# Patient Record
(demographics unavailable — no encounter records)

---

## 2024-10-29 NOTE — PHYSICAL EXAM
[Well Developed] : well developed [Well Nourished] : well nourished [No Acute Distress] : no acute distress [Normal Conjunctiva] : normal conjunctiva [Normal Venous Pressure] : normal venous pressure [No Carotid Bruit] : no carotid bruit [Normal S1, S2] : normal S1, S2 [No Murmur] : no murmur [No Rub] : no rub [No Gallop] : no gallop [Clear Lung Fields] : clear lung fields [Good Air Entry] : good air entry [No Respiratory Distress] : no respiratory distress  [Soft] : abdomen soft [Normal Gait] : normal gait [No Cyanosis] : no cyanosis [No Clubbing] : no clubbing [No Varicosities] : no varicosities [No Rash] : no rash [No Skin Lesions] : no skin lesions [Moves all extremities] : moves all extremities [No Focal Deficits] : no focal deficits [Normal Speech] : normal speech [Alert and Oriented] : alert and oriented [Normal memory] : normal memory [de-identified] : Unequal carotid upstrokes [de-identified] : Truncal obesity [de-identified] : Trace edema.

## 2024-10-29 NOTE — REVIEW OF SYSTEMS
Disc results with patient. Questions answered [Dyspnea on exertion] : dyspnea during exertion [Negative] : Heme/Lymph [FreeTextEntry6] : Sleep apnea

## 2024-10-29 NOTE — REASON FOR VISIT
[FreeTextEntry1] : This is a 66-year-old male with history of CAD, status post PCI (see details below), obesity, hypertension, mixed hyperlipidemia, fatty liver, AAA, sleep apnea and prediabetes.  Patient activity is limited by low back pain.  He is seeing pain management.  He is scheduled for epidural injection treatment. Activity level is mostly unchanged.  Limited by backache and weight. Denies exertional angina.  No PND, orthopnea or pedal edema.  No palpitations.   Status post AAA repair from which he recovered uneventfully.  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    Hypertension: Blood pressure is borderline high.  In the past, valsartan dose was reduced.  He continues to take Toprol and HCTZ.    On 3/29/2022, he had chest discomfort described as indigestion and pressure at 10:30 in the morning.  He went to the ER at [facility].  He was found to have STEMI as well as severely hypertensive - given IV labetalol.  The patient was taken to the Cath Lab and had occluded LAD which was opened up and had PCI x2 in the LAD and staged subsequent LCx PCI.  His LDL was in range from 156-170 prior to starting statins on discharge.  Since statins were started, his LDL has come down to 79 in September 2022 with normal LFT and HDL 57.  Labs in April 2024: A1c 6, LDL 75, normal creatinine and potassium.  Hemoglobin 15.8.  Echocardiograph  on 3/31/2022 showed EF of 55 to 60% with impaired relaxation.  No pericardial effusion.  Repeat echocardiogram in May 2024 did not show significant change.  There was mild LVH.  Normal PASP  EKG April 2022 shows sinus rhythm, anterior fascicular block.  Lexiscan stress test with SPECT November 2023: Probably normal perfusion.  No evidence of ischemia.  Exercise stress test in May 2024 -the test was designed to be limited to walking 5 METS.  He had no angina.  No ischemic EKG changes.

## 2024-10-29 NOTE — DISCUSSION/SUMMARY
[FreeTextEntry1] : 66 year-old male with several atherosclerotic risk factors and comorbid conditions as noted in HPI.  Status post anterior wall STEMI on 3/29/2022.  Status post PCI and NICKIE in LAD x2, staged LCx PCI with NICKIE.     3-day monitor showed PVC burden of 2.3%.  No other significant arrhythmias.  Asymptomatic, on beta-blockers  Echocardiogram in November 2023 and May 2024: Normal EF.  Slightly dilated aortic root.  No significant change.  Slight LVH  StressShould be continued without interruption; or the other option is to stop Plavix 5 days before the procedure and start aspirin 81 mg daily at the same time and continue a test with SPECT November 2023: See above.  Subsequent low-level ETT in May 2024 noted above.  Hyperlipidemia: Continue statin therapy.  LDL decreased  on statins.  Target LDL less than 70.  Reported above.  Currently on aspirin and on Plavix daily.  Carotid ultrasound was done in April 2022: Nonobstructive moderate disease.  Continue current meds  **PREOP: On 10/29/2024 Patient scheduled for epidural injection treatment for backache.  On low-level ETT, she was able to exercise 5 METS and reached 75% of max heart rate without angina or ischemic EKG changes.  Echocardiogram showed normal EF, normal PASP and no change compared to previous.  Plavix can be held for 7 days prior to procedure to reduce bleeding complication.  Aspirin should be continued uninterrupted.  Postoperative, Plavix should be resumed as per the pain specialist.  Other cardiac medications should be continued uninterrupted.  Thank you for this referral and allowing me to participate in the care of this patient.  If I can be of any further help or  if you have any questions, please do not hesitate to contact me  Sincerely,  John Anders MD, FACC, [place]

## 2025-04-17 NOTE — DISCUSSION/SUMMARY
[FreeTextEntry1] : RENY VALADEZ is a 67 year old M who presents today Apr 17, 2025 with the above history and the following active issues:   STEMI - CAD s/p PCI with NICKIE x2 at LAD, NICKIE at LCx 2022. No exertional sx. Compliant with meds. He did not deescalate from DAPT. He remains on ASA and Clopidogrel. At this time, he may discontinue Clopidogrel. He should remain on ASA indefinitely with no interruption.   HTN: Well controlled. Continue current medications.   Dilated aorta: Aortic Root 4.4cm in 5/2024. Recommend Echo to reevaluate. F/u to review results.   HLD: No recent lab work available for my review, LDL was 73 in 2024.   AAA s/p repair: Ongoing f/u with Dr. Carrillo.   ANAIS: Noncompliant with CPAP. Importance of PAP reinforced to him. Recommended f/u with pulm for alternative options.   pre-DM/DM? A1C was 6.3% in 2024.  Obesity with BMI 37.13.  Chronic back pain with limited mobility. GLP1a would be the safest option for weight loss for this patient and this in the ONLY class of medications that has been proven to lower the risk of major cardiovascular events such as stroke, heart attack, or death in adults with known heart disease and cardiovascular risk factors.  Patient educated and advised to follow active lifestyle with regular cardiovascular exercise, as well as diet modification with low sodium, low fat, and avoidance of excessive alcohol.   Updated lab work has been requested including A1C and lipid profile. Lab slip provided to him. He will have this done prior to next visit.   In regard to his upcoming injection with pain management: Patient should remain on ASA uninterrupted, discontinue Plavix as above. At present, there are no unstable coronary syndromes, decompensated heart failure, severe valvular heart disease or significant dysrhythmias. Baseline functional status is acceptable. Risk not attenuated with further CV testing. Prior testing as outlined above. The clinical benefit of the proposed procedure outweighs the associated cardiovascular risk.   F/u after testing or sooner PRN.  Red flag symptoms which would warrant sooner emergent evaluation reviewed with the patient. All questions and concerns were addressed and answered.   Sincerely,  Susan Alexander, [facility] Patient's history, testing, medications and any relative changes in plan of care reviewed with supervising MD: Dr. Joel Ruvalcaba.

## 2025-04-17 NOTE — DISCUSSION/SUMMARY
[FreeTextEntry1] : RENY VALADEZ is a 67 year old M who presents today Apr 17, 2025 with the above history and the following active issues:   STEMI - CAD s/p PCI with NICKIE x2 at LAD, NICKIE at LCx 2022. No exertional sx. Compliant with meds. He did not deescalate from DAPT. He remains on ASA and Clopidogrel. At this time, he may discontinue Clopidogrel. He should remain on ASA indefinitely with no interruption.   HTN: Well controlled. Continue current medications.   Dilated aorta: Aortic Root 4.4cm in 5/2024. Recommend Echo to reevaluate. F/u to review results.   HLD: No recent lab work available for my review, LDL was 73 in 2024.   AAA s/p repair: Ongoing f/u with Dr. Carrillo.   ANAIS: Noncompliant with CPAP. Importance of PAP reinforced to him. Recommended f/u with pulm for alternative options.   pre-DM/DM? A1C was 6.3% in 2024.  Obesity with BMI 37.13.  Chronic back pain with limited mobility. GLP1a would be the safest option for weight loss for this patient and this in the ONLY class of medications that has been proven to lower the risk of major cardiovascular events such as stroke, heart attack, or death in adults with known heart disease and cardiovascular risk factors.  Patient educated and advised to follow active lifestyle with regular cardiovascular exercise, as well as diet modification with low sodium, low fat, and avoidance of excessive alcohol.   Updated lab work has been requested including A1C and lipid profile. Lab slip provided to him. He will have this done prior to next visit.   In regard to his upcoming injection with pain management: Patient should remain on ASA uninterrupted, discontinue Plavix as above. At present, there are no unstable coronary syndromes, decompensated heart failure, severe valvular heart disease or significant dysrhythmias. Baseline functional status is acceptable. Risk not attenuated with further CV testing. Prior testing as outlined above. The clinical benefit of the proposed procedure outweighs the associated cardiovascular risk.   F/u after testing or sooner PRN.  Red flag symptoms which would warrant sooner emergent evaluation reviewed with the patient. All questions and concerns were addressed and answered.   Sincerely,  Susan Alexander, [facility] Patient's history, testing, medications and any relative changes in plan of care reviewed with supervising MD: Dr. Joel Ruvalcaba.

## 2025-04-17 NOTE — CARDIOLOGY SUMMARY
[de-identified] : 5/2024 Exercised 2mins 20sec on Jose Alberto protocol, 5 METS. Negative for ischemia with low specificity.  12/2023 [facility]: Probably normal, negative EKGs.  [de-identified] : 5/2024 LVEF 60-65%, normal diastolic function, mild LVH, Aortic Root 4.4cm. PASP 16mmHg.  [de-identified] : 4/2024 CTA Abd/Pelvis: Uncomplicated infrarenal aortic aneurysm (5.8 cm, max diameter), enlarged from 4/2/2024 (5 cm) and bilateral iliac artery aneurysms (2.2 cm max diameter, from 2 cm).  [de-identified] : 5/2024 Abd US Duplex: 5.7 x 5.7cm infrarenal abdominal aortic aneurysm with mural thrombus.

## 2025-04-17 NOTE — PHYSICAL EXAM
[No Acute Distress] : no acute distress [Obese] : obese [No Carotid Bruit] : no carotid bruit [Normal S1, S2] : normal S1, S2 [No Murmur] : no murmur [Clear Lung Fields] : clear lung fields [No Respiratory Distress] : no respiratory distress  [Abnormal Gait] : abnormal gait [No Edema] : no edema [Normal Radial B/L] : normal radial B/L [Moves all extremities] : moves all extremities [No Focal Deficits] : no focal deficits [Normal Speech] : normal speech [Alert and Oriented] : alert and oriented [de-identified] : secondary to back pain and L foot drop (nerve disorder from L4/L5 per pt).

## 2025-04-17 NOTE — REVIEW OF SYSTEMS
[Easy Bruising] : a tendency for easy bruising [Negative] : Neurological [FreeTextEntry9] : back pain, see HPI.

## 2025-04-17 NOTE — HISTORY OF PRESENT ILLNESS
[FreeTextEntry1] : Riaz is a 67yoM with PMHx: STEMI - CAD s/p PCI (NICKIE at LAD x2, and LCx 2022), HTN, HLD, dilated aortic root, AAA s/p EVAR repair Dr. Melgoza 2024 (c/b accessory renal artery), ANAIS, pre-DM, fatty liver and obesity. Also, back pain, follows with pain mgmt.   He was last seen 10/2024 feeling well with no active CV sx, noncompliant with CPAP.   He presents in clinic today for routine interval follow up. He reports feeling well from a CV perspective. He is not very active secondary to back pain, he walks and stair climbs in his home with no exertional sx.  There is no exertional chest pain, pressure or discomfort. There is no significant dyspnea on exertion or shortness of breath. There is no LE edema, PND or orthopnea. There are no symptomatic palpitations, lightheadedness, dizziness or syncope.   BP is well controlled at 124/70.  He has upcoming spinal injection with pain management, 2 weeks from now.  He has ongoing f/u with Dr. Carrillo at 6-month intervals regarding AAA s/p repair, last visit was <2 months ago and reports doing well with no changes.

## 2025-04-17 NOTE — CARDIOLOGY SUMMARY
[de-identified] : 5/2024 Exercised 2mins 20sec on Jose Alberto protocol, 5 METS. Negative for ischemia with low specificity.  12/2023 [facility]: Probably normal, negative EKGs.  [de-identified] : 5/2024 LVEF 60-65%, normal diastolic function, mild LVH, Aortic Root 4.4cm. PASP 16mmHg.  [de-identified] : 4/2024 CTA Abd/Pelvis: Uncomplicated infrarenal aortic aneurysm (5.8 cm, max diameter), enlarged from 4/2/2024 (5 cm) and bilateral iliac artery aneurysms (2.2 cm max diameter, from 2 cm).  [de-identified] : 5/2024 Abd US Duplex: 5.7 x 5.7cm infrarenal abdominal aortic aneurysm with mural thrombus.

## 2025-04-17 NOTE — ADDENDUM
[FreeTextEntry1] :  I personally seen the patient and performed the evaluation and management services. I discussed the care with the ACP , reviewed the note, and agree with plan of care as outlined above, except where documented below, which is my personal assessment/plan.  STEMI - CAD s/p PCI with NICKIE x2 at LAD, NICKIE at LCx 2022. No exertional sx. Compliant with meds. He did not deescalate from DAPT. He remains on ASA and Clopidogrel. At this time, he may discontinue Clopidogrel. He should remain on ASA indefinitely with no interruption.   HTN: Well controlled. Continue current medications.   Dilated aorta: Aortic Root 4.4cm in 5/2024. Recommend Echo to reevaluate. F/u to review results.   HLD: No recent lab work available for my review, LDL was 73 in 2024.   AAA s/p repair: Ongoing f/u with Dr. Carrillo.   ANAIS: Noncompliant with CPAP. Importance of PAP reinforced to him. Recommended f/u with pulm for alternative options.

## 2025-04-17 NOTE — PHYSICAL EXAM
[No Acute Distress] : no acute distress [Obese] : obese [No Carotid Bruit] : no carotid bruit [Normal S1, S2] : normal S1, S2 [No Murmur] : no murmur [Clear Lung Fields] : clear lung fields [No Respiratory Distress] : no respiratory distress  [Abnormal Gait] : abnormal gait [No Edema] : no edema [Normal Radial B/L] : normal radial B/L [Moves all extremities] : moves all extremities [No Focal Deficits] : no focal deficits [Normal Speech] : normal speech [Alert and Oriented] : alert and oriented [de-identified] : secondary to back pain and L foot drop (nerve disorder from L4/L5 per pt).

## 2025-05-19 NOTE — HISTORY OF PRESENT ILLNESS
[FreeTextEntry1] : Riaz is a 67yoM with PMHx: STEMI - CAD s/p PCI (NICKIE at LAD x2, and LCx 2022), HTN, HLD, dilated aortic root, AAA s/p EVAR repair Dr. Melgoza 2024 (c/b accessory renal artery), ANAIS, pre-DM, fatty liver and obesity. Also, back pain, follows with pain mgmt.   He is not very active secondary to back pain. There is no exertional chest pain, pressure or discomfort. There is no significant dyspnea on exertion or shortness of breath. There is no LE edema, PND or orthopnea. There are no symptomatic palpitations, lightheadedness, dizziness or syncope.   BP is borderline controlled at 130 / 84 mmHg  He has ongoing f/u with Dr. Carrillo at 6-month intervals regarding AAA s/p repair.  No acute changes at last OV with him

## 2025-05-19 NOTE — PHYSICAL EXAM
[No Acute Distress] : no acute distress [Obese] : obese [No Carotid Bruit] : no carotid bruit [Normal S1, S2] : normal S1, S2 [No Murmur] : no murmur [Clear Lung Fields] : clear lung fields [No Respiratory Distress] : no respiratory distress  [Abnormal Gait] : abnormal gait [No Edema] : no edema [Normal Radial B/L] : normal radial B/L [Moves all extremities] : moves all extremities [No Focal Deficits] : no focal deficits [Normal Speech] : normal speech [Alert and Oriented] : alert and oriented [de-identified] : secondary to back pain and L foot drop (nerve disorder from L4/L5 per pt).

## 2025-05-19 NOTE — DISCUSSION/SUMMARY
[FreeTextEntry1] : RENY VALADEZ is a 67 year old M who presents today Apr 17, 2025 with the above history and the following active issues:   STEMI - CAD s/p PCI with NICKIE x2 at LAD, NICKIE at LCx 2022. No exertional sx. Compliant with meds. On ASA indefinitely with no interruption.   Echocardiogram done in the office today: 5/19/2025: Normal LV function.  No significant change.   Normal PASP ascending aorta and aortic root 4.4 cm.  Unchanged.  Mild LVH.  HTN: Well controlled. Continue current medications.   HLD: No recent lab work available for my review, LDL was 73 in 2024.   AAA s/p repair: Ongoing f/u with Dr. Carrillo.   ANAIS: Noncompliant with CPAP. Importance of PAP reinforced to him. Recommended f/u with pulm for alternative options.   pre-DM/DM? A1C was 6.3% in 2024. Obesity with BMI 37.13.  Currently on Ozempic.  At low dose.  Weight loss would significantly help blood pressure, cholesterol, backache, diabetes and CAD  Patient educated and advised to follow active lifestyle with regular cardiovascular exercise, as well as diet modification with low sodium, low fat, and avoidance of excessive alcohol.   Thank you for this referral and allowing me to participate in the care of this patient.  If I can be of any further help or  if you have any questions, please do not hesitate to contact me  Sincerely, John Anders MD, FACC, [place]

## 2025-05-19 NOTE — PHYSICAL EXAM
[No Acute Distress] : no acute distress [Obese] : obese [No Carotid Bruit] : no carotid bruit [Normal S1, S2] : normal S1, S2 [No Murmur] : no murmur [Clear Lung Fields] : clear lung fields [No Respiratory Distress] : no respiratory distress  [Abnormal Gait] : abnormal gait [No Edema] : no edema [Normal Radial B/L] : normal radial B/L [Moves all extremities] : moves all extremities [No Focal Deficits] : no focal deficits [Normal Speech] : normal speech [Alert and Oriented] : alert and oriented [de-identified] : secondary to back pain and L foot drop (nerve disorder from L4/L5 per pt).

## 2025-05-19 NOTE — DISCUSSION/SUMMARY
[facility] Internal [facility] Progress Note      Subjective    Feels ok. Pain is controlled. Nephew was present in the room. All questions answered.        Medications  Current Facility-Administered Medications   Medication Dose Route Frequency Provider Last Rate Last Admin   • folic acid (FOLATE) tablet 1 mg  1 mg Oral Daily Mahad Conrad DO   1 mg at 07/13/23 0824   • albumin human 5 % injection 12.5 g  12.5 g Intravenous Once Michaelle Blackwood  mL/hr at 07/13/23 1051 12.5 g at 07/13/23 1051    Followed by   • albumin human 5 % injection 12.5 g  12.5 g Intravenous Once Michaelle Blackwood DO       • NORepinephrine (LEVOPHED) 8 mg/250 mL in dextrose 5 % infusion  0-30 mcg/min Intravenous Continuous Mahad Conrad DO   Completed at 07/12/23 0751   • acetaminophen (TYLENOL) tablet 650 mg  650 mg Per NG Tube Q4H PRN Mahad Conrad DO   650 mg at 07/13/23 0533   • sodium chloride 0.9% infusion   Intravenous Continuous PRN Mahad Conrad DO       • meropenem (MERREM) 1 g in sodium chloride 0.9 % 100 mL IVPB  1 g Intravenous Q24H Larry Callahan MD   Completed at 07/12/23 1946   • aspirin chewable 81 mg  81 mg Per NG Tube Daily Ingrid Wiggins MD   81 mg at 07/13/23 0824   • polyethylene glycol (MIRALAX) packet 17 g  17 g Per NG Tube Daily PRN Ingrid Wiggins MD       • docusate sodium-sennosides (SENOKOT S) 50-8.6 MG 1 tablet  1 tablet Per NG Tube Nightly PRN Michaelle Blackwood DO       • heparin (porcine) injection 5,000 Units  5,000 Units Subcutaneous 3 times per day Michaelle Blackwood DO   5,000 Units at 07/13/23 0533   • melatonin tablet 6 mg  6 mg Per NG Tube Nightly Michaelle Blackwood DO   6 mg at 07/12/23 2006   • sodium chloride (PF) 0.9 % injection 10 mL  10 mL Intracatheter PRN Young Cooley MD       • sodium chloride (NORMAL SALINE) 0.9 % bolus 100-200 mL  100-200 mL Intravenous PRN Young Cooley MD       • sodium chloride 0.9% infusion   Intravenous Continuous PRN Mahad Conrad,  36 (23 1100) Resp  Min: 20  Max: 36   Non-Invasive  Blood Pressure 117/57 (23 1100) BP  Min: 97/50  Max: 122/62   Pulse Oximetry 97 % (23 1100) SpO2  Min: 94 %  Max: 98 %   Arterial   Blood Pressure 130/59 (07/10/23 1300) No data recorded         Physical Exam  Physical Exam  Vitals and nursing note reviewed.   Constitutional:       General: He is not in acute distress.     Appearance: He is obese.   HENT:      Mouth/Throat:      Mouth: Mucous membranes are dry.      Neck: Neck supple.   Eyes:      Conjunctiva/sclera: Conjunctivae normal.   Cardiovascular:      Rate and Rhythm: Regular rhythm. Tachycardia present.      Heart sounds: No murmur heard.     No friction rub. No gallop.   Pulmonary:      Effort: No respiratory distress.      Breath sounds: Normal breath sounds. No wheezing or rales.   Abdominal:      Palpations: Abdomen is soft.      Tenderness: There is no abdominal tenderness. There is no guarding.      Comments: Large bruise to the right flank    Genitourinary:     Comments: Rectal tube+  Dalton+  Musculoskeletal:         General: No swelling or tenderness.      Comments: RUE approximately from elbow to wrist with erythema, blistering, decreased palpable pulse radially, discolored, cool, capillary refill <2 sec,   Compartments soft   Skin:     General: Skin is warm.      Findings: No rash.      Comments: RUE with findings as above   Neurological:      Mental Status: He is alert.          Imaging  TRANSTHORACIC ECHO (TTE) COMPLETE W/ W/O IMAGING AGENT    Result Date: 2023  *[facility]* [address] [phone] Transthoracic Echocardiogram (TTE) Patient: Thad Rothmanvi      Study Date/Time:    2023 9:05PM MRN:     [id number]                   FIN#:               [id number] :     1952                 Ht/Wt:              180cm 108kg Age:     70                         BSA/BMI:            2.36m^2 33.3kg/m^2  Gender:  M                          Baseline BP:        99 / 85 Ordering Physician:     Josephine Bright  Referring Physician:    Josephine Bright  Attending Physician:    Prem Stone  Diagnostic Physician:   Ren Olivas MD Sonographer:            Yung Soto RCS  ------------------------------------------------------------------------------ INDICATIONS:   Atrial flutter. ------------------------------------------------------------------------------ STUDY CONCLUSIONS SUMMARY: 1. Left ventricle: The cavity size is normal. Wall thickness is mildly    increased. Systolic function is severely reduced. The ejection fraction was    measured by biplane method of disks. Grade I diastolic dysfunction. The    ejection fraction is 25%. 2. Right ventricle: The cavity size is normal. Systolic function is reduced. ------------------------------------------------------------------------------ STUDY DATA:   Procedure:  A transthoracic echocardiogram was performed. Image quality was good.  M-mode, complete 2D, complete spectral Doppler, and color Doppler.  Study status:  Routine.  Study completion:  There were no complications. FINDINGS BASELINE ECG:   Normal sinus rhythm. LEFT VENTRICLE:  The cavity size is normal. Wall thickness is mildly increased. Systolic function is severely reduced. There is severe diffuse hypokinesis.    The ejection fraction was measured by biplane method of disks. The ejection fraction is 25%. The tissue Doppler parameters are abnormal. Grade I diastolic dysfunction. AORTIC VALVE:  The annulus is normal-sized and mildly calcified. The valve is trileaflet. The leaflets are mildly thickened. Velocity is within the normal range. There is no stenosis. There is no regurgitation. AORTA: Aortic root: The root is normal-sized. Ascending aorta: The vessel is normal-sized. MITRAL VALVE:  The annulus is normal-sized. The annulus is mildly calcified. The leaflets are mildly thickened. Inflow velocity is  within the normal range. There is no evidence for stenosis. There is trivial regurgitation. The valve area by pressure half-time is 3.4cm^2. The valve area index by pressure half-time is 1.43cm^2/m^2. ATRIAL SEPTUM:   Color doppler shows no obvious shunt. LEFT ATRIUM:  The atrium is normal in size. RIGHT VENTRICLE:  The cavity size is normal. Systolic function is reduced. PULMONIC VALVE:   The annulus is normal-sized. The leaflets are normal thickness. Velocity is within the normal range. There is no evidence for stenosis. There is no regurgitation. TRICUSPID VALVE:  The annulus is normal-sized. The leaflets are normal thickness. Inflow velocity is within the normal range. There is no evidence for stenosis. There is trivial regurgitation. RIGHT ATRIUM:  The atrium is normal in size. PERICARDIUM:   There is no pericardial effusion. SYSTEMIC VEINS:  Not visualized. ------------------------------------------------------------------------------ Measurements  Left ventricle            Value        Ref        Left ventricle continued     Value          Ref  SULEMA, LAX chord        (N) 4.4   cm     4.2 - 5.8  E', avg, TDI                 7.885 cm/sec   ---------  ESD, LAX chord        (N) 3.9   cm     2.5 - 4.0  E/e', avg, TDI           (N) 4              <=14  SULEMA/bsa, LAX chord    (L) 1.9   cm/m^2 2.2 - 3.0  ESD/bsa, LAX chord    (N) 1.6   cm/m^2 1.3 - 2.1  Left atrium                  Value          Ref  PW, ED, LAX           (H) 1.2   cm     0.6 - 1.0  AP dim, ES               (H) 5.0   cm       3.0 - 4.0  SULEMA major ax, A4C         8.3   cm     ---------  AP dim index, ES         (N) 2.1   cm/m^2   1.5 - 2.3  ESD major ax, A4C         7.3   cm     ---------  Area ES, A4C             (N) 19    cm^2     <=20  FS major axis, A4C        12    %      ---------  Area/bsa ES, A4C             8.14  cm^2/m^2 ---------  SULEMA/bsa major ax, A4C     3.5   cm/m^2 ---------  Area ES, A2C                 21    cm^2     ---------  ESD/bsa  [FreeTextEntry1] : RENY VALADEZ is a 67 year old M who presents today Apr 17, 2025 with the above history and the following active issues:   STEMI - CAD s/p PCI with NICKIE x2 at LAD, NICKIE at LCx 2022. No exertional sx. Compliant with meds. On ASA indefinitely with no interruption.   Echocardiogram done in the office today: 5/19/2025: Normal LV function.  No significant change.   Normal PASP ascending aorta and aortic root 4.4 cm.  Unchanged.  Mild LVH.  HTN: Well controlled. Continue current medications.   HLD: No recent lab work available for my review, LDL was 73 in 2024.   AAA s/p repair: Ongoing f/u with Dr. Carrillo.   ANIAS: Noncompliant with CPAP. Importance of PAP reinforced to him. Recommended f/u with pulm for alternative options.   pre-DM/DM? A1C was 6.3% in 2024. Obesity with BMI 37.13.  Currently on Ozempic.  At low dose.  Weight loss would significantly help blood pressure, cholesterol, backache, diabetes and CAD  Patient educated and advised to follow active lifestyle with regular cardiovascular exercise, as well as diet modification with low sodium, low fat, and avoidance of excessive alcohol.   Thank you for this referral and allowing me to participate in the care of this patient.  If I can be of any further help or  if you have any questions, please do not hesitate to contact me  Sincerely, John Anders MD, FACC, SOLOMON Examination: US KIDNEY BILATERAL RENAL ULTRASOUND HISTORY: Acute kidney injury COMPARISON: None TECHNIQUE:  The kidneys and urinary bladder were examined using grayscale and color Doppler ultrasound in multiple planes. FINDINGS: The examination is extremely limited due to patient's limited mobility. The right kidney could not be visualized due to shadowing from overlying ribs. The left kidney measures 11 cm in length. There is no left hydronephrosis or contour deforming solid renal mass.  The urinary bladder could not be visualized likely due to underdistention. Incidentally noted is a small right pleural effusion.     1.    The right kidney was not seen due to shadowing from overlying ribs and patient's limited mobility 2.    The left kidney is a right unremarkable Electronically Signed by: BIPIN GONZALES M.D. Signed on: 6/28/2023 8:11 PM Workstation ID: [id number]    US VASC EXTREMITY UPPER VENOUS DUPLEX    Result Date: 6/28/2023  US VASC EXTREMITY UPPER VENOUS DUPLEX RIGHT : 6/28/2023 4:23 PM HISTORY:  Right upper extremity edema; traumatic rhabdomyolysis, initial encounter. COMPARISON: None. FINDINGS: Grayscale imaging, color flow and pulsed Doppler interrogation of the deep veins and the superficial veins of the right upper extremity and of the right internal jugular and subclavian veins was performed. There is normal compressibility and augmentation within the deep veins in the right upper extremity. The right internal jugular vein and the bilateral subclavian veins demonstrate flow and transmitted right atrial pulsations. The cephalic vein is normal.  The basilic vein is not visualized.     No evidence of deep or superficial thrombosis in the right upper extremity veins. Electronically Signed by: NAVI BOWERS MD Signed on: 6/28/2023 8:08 PM Workstation ID: [id number]      Labs       Recent Labs   Lab 07/13/23  0344 07/12/23  1155 07/12/23  0400 07/11/23  1200 07/11/23  0419 07/10/23  0418   WBC  15.1* 17.4* 16.7*   < > 19.5* 19.3*   HCT 26.0* 25.7* 21.8*   < > 23.6* 23.0*   HGB 8.1* 8.3* 6.9*   < > 7.3* 7.3*    278 249   < > 195 155   SODIUM 142  --  144  --  143 144   POTASSIUM 4.0  --  4.0  --  3.7 3.5   CHLORIDE 107  --  109  --  109 111*   CO2 30  --  25  --  30 28   CALCIUM 8.0*  --  8.0*  --  8.0* 7.8*   GLUCOSE 102*  --  136*  --  137* 115*   BUN 44*  --  54*  --  34* 48*   CREATININE 3.18*  --  4.08*  --  2.74* 4.01*   AST 58*  --  42*  --   --  89*   GPT 35  --  30  --   --  44   ALKPT 119*  --  102  --   --  93   BILIRUBIN 0.5  --  0.4  --   --  0.6   ALBUMIN 1.6*  --  1.6*  --   --  1.6*   PHOS 4.4  --  4.2  --  2.2*  --     < > = values in this interval not displayed.       Assessment and Plan:    # Shock - hemorrhagic + septic   # Acute blood loss anemia 2/2 hematemesis, acute upper GI bleed 2/2 multiple gastric ulcers s/p hemoclip placement x 1 (7/3/2023)  # Acute hypoxic respiratory failure 2/2 shock state  # Possible aspiration PNA  -Bronchoscopy (7/2): Full segmental airway evaluation.There are red, frothy secretions noted in bilateral lungs in multiple segments that appear consistent with what has been suctioned from his mouth concerning for aspiration.  Secretions were aspirated as best as able with minimal residual secretions at the completion. No obvious mucous plugging.  -CTA A/P (7/2): Evaluation for intraluminal hemorrhage is limited by presence of pre-existing enteric contrast, particularly in the stomach, ascending colon, sigmoid and rectum.  No findings of high-grade acute extravasation within the limitations of the exam. Mildly distended fluid-filled distal small bowel and colon without focal obstruction. Findings may suggest ileus and/or mild enterocolitis.   -S/p EGD (7/3): Multiple gastric ulcers, one with visible vessel, treated with hemoclip placement x 1. Large clot in stomach  -S/p EGD (7/6): Large amount of old blood in stomach as well as clots without active  bleeding. Previous gastric ulcer with hemoclip in place  -CT CAP (7/11): Slight thickening of the ascending colon may be due to an adequate distention.  Adjacent mesenteric inflammation along the abdominal  wall.  No bowel obstruction.   - Hb 7.3x2->7.8->6.9->8.1 (7/13)   - S/p multiple pRBC transfusions in this admission   - Large hematemesis and hypotensive 7/2 AM   - Extubated (7/9). Currently on room air   - Off phenylephrine, Propofol, vaso, fentanyl, precedex, and norepi drips drips   - Heparin drip dc'd   - Off Protonix drip, continue PPI 40 mg BID   - Discontinued IV Reglan   - S/p abx as below   - MiniBAL (7/2): grew yeast, not C. Neoformans/gattii   - NG tube in place, continue TFs   - Continue bowel regimen   - GI following   - Critical care following    # Traumatic rhabdomyolysis  # S/p fall  # Right arm, crush injury   -CT R Forearm (7/2): Severely limited examination due to patient body habitus, nonstandard positioning and lack of IV contrast. No large fluid collection or abscess appreciated within limitations of study. No soft tissue gas. Severe ulnotrochlear and radiocapitellar osteoarthrosis.  -CPK 64153->15172->97498-> >17928->48329->6890-> 4168->1879 (7/4)             - Compartments soft at this time             - Pain control with Norco PRN             - Ice packs to affected area             - Will monitor CPK             - Off IVF              -Ortho following, No surgical intervention at this time              -PT/OT consulted              -Wound care and plastic surgery following   -Trauma surgery following     # Possible CVA  # Facial droop  # RLE weakness  # Aphasia  -CT Head (6/27): No acute intracranial hemorrhage.  -CTA Head (6/27): No large arterial occlusion identified in the head or neck. Near complete focal occlusion of the distal left P2 segment with patent distal branch vessels. Severe focal stenosis at the distal right M1 segment/bifurcation with patent distal branch vessels.  present  -MRSA nares not detected (7/11)   - currently on room air             -WBC 59.6->52.0->...->15.1 (7/13)             - Persistently febrile   -Procal 154.80->114.54 (7/4)             -DC'd ceftriaxone and azithromycin   -continue IV meropenem    -Completed course of IV Zosyn    -Flow cytometry pending (7/11)   -Urine culture (6/27): E. Coli             -Blood cx x2 (6/27): Negative   -Blood cultures x2 (7/2): Negative    -Blood cx (7/11): NGTD   -MiniBAL (7/2) as above   -sputum cx pend   -ID following, Continue meropenem    Leukocytosis, improving   -Pathology smear review (7/2): Normocytic normochromic anemia with mild polychromasia and circulating nucleated red blood cells. Neutrophilia with left shift including increased myelocytes and promyelocytes. No eosinophilia or basophilia. Mild monocytosis with mature appearing monocytes. Clinical correlation is suggested. Differential diagnosis includes marked reactive leukoerythroblastosis and myeloproliferative disorder.   -WBC as above   -S/p IV abx as above    # Type 2 NSTEMI troponin elevation 2/2 demand/supply mismatch in the setting of renal insufficiency and rhabdomyolysis  # New onset combined systolic and diastolic HF  -Lipids (6/28): HDL 34, rest WNL  -TTE (6/28): LVEF 25%. Severely reduced systolic function. G1DD. Reduced RV systolic function.  -Troponin 462->608->483->326->143 (7/1)             -continue ASA    -holding BB   -S/p IV Lasix 80 mg x 2 (7/9)   -Cardiology following, no ace/arb/arni due to renal insuffic. Repeat limited TTE tomorrow to eval pericardial effusion   -TTE ordered     New onset A-Fib, rate-controlled             -holding metoprolol 25 mg BID   -Off heparin drip             -Cardiology following, No anticoag due to high risk of bleeding - risk of bleeding outweighs risk of CVA     # ROSALINDA, likely ATN in the setting of rhabdomyolysis and pre-renal from dehydration, also received significant contrast load, currently anuric  #  

## 2025-05-19 NOTE — CARDIOLOGY SUMMARY
[de-identified] : 5/2024 Exercised 2mins 20sec on Jose Alberto protocol, 5 METS. Negative for ischemia with low specificity.  12/2023 [facility]: Probably normal, negative EKGs.  [de-identified] : 5/2024 LVEF 60-65%, normal diastolic function, mild LVH, Aortic Root 4.4cm. PASP 16mmHg.  [de-identified] : 4/2024 CTA Abd/Pelvis: Uncomplicated infrarenal aortic aneurysm (5.8 cm, max diameter), enlarged from 4/2/2024 (5 cm) and bilateral iliac artery aneurysms (2.2 cm max diameter, from 2 cm).  [de-identified] : 5/2024 Abd US Duplex: 5.7 x 5.7cm infrarenal abdominal aortic aneurysm with mural thrombus.

## 2025-05-19 NOTE — CARDIOLOGY SUMMARY
[de-identified] : 5/2024 Exercised 2mins 20sec on Jose Alberto protocol, 5 METS. Negative for ischemia with low specificity.  12/2023 [facility]: Probably normal, negative EKGs.  [de-identified] : 5/2024 LVEF 60-65%, normal diastolic function, mild LVH, Aortic Root 4.4cm. PASP 16mmHg.  [de-identified] : 4/2024 CTA Abd/Pelvis: Uncomplicated infrarenal aortic aneurysm (5.8 cm, max diameter), enlarged from 4/2/2024 (5 cm) and bilateral iliac artery aneurysms (2.2 cm max diameter, from 2 cm).  [de-identified] : 5/2024 Abd US Duplex: 5.7 x 5.7cm infrarenal abdominal aortic aneurysm with mural thrombus.